# Patient Record
Sex: FEMALE | Race: WHITE | HISPANIC OR LATINO | Employment: UNEMPLOYED | URBAN - METROPOLITAN AREA
[De-identification: names, ages, dates, MRNs, and addresses within clinical notes are randomized per-mention and may not be internally consistent; named-entity substitution may affect disease eponyms.]

---

## 2024-05-15 ENCOUNTER — TELEPHONE (OUTPATIENT)
Dept: OBGYN | Facility: CLINIC | Age: 29
End: 2024-05-15

## 2024-05-15 DIAGNOSIS — Z33.2 TERMINATION OF PREGNANCY (FETUS): ICD-10-CM

## 2024-05-15 DIAGNOSIS — Z34.02 ENCOUNTER FOR SUPERVISION OF NORMAL FIRST PREGNANCY IN SECOND TRIMESTER: Primary | ICD-10-CM

## 2024-05-15 NOTE — TELEPHONE ENCOUNTER
Called patient with provided number below: [phone]     Per patient there was something wrong with her insurance so she resubmitted for it and she is waiting from approval from her employer.  States she should have are coverage.    Encouraged patient to call back ASAP when she has her insurance information.    Updated financial team with information above.    Annette Tomas RN

## 2024-05-15 NOTE — TELEPHONE ENCOUNTER
We can start looking at dates but she needs an ultrasound first with measurements first.   May be a good idea to ask [facility] about their availability. I am not available on my off days for the next 2 weeks.     1. Encounter for supervision of normal first pregnancy in second trimester    - US OB > 14 Weeks; Future    2. Termination of pregnancy (fetus)    - Case Request: DILATION AND EVACUATION, UTERUS UNDER ULTRASDOUND GUIDANCE FOR TERMINATION OF PREGNANCY    Wilma Turner MD

## 2024-05-15 NOTE — TELEPHONE ENCOUNTER
"Attempted to call patient to get insurance information to check on financial services.  Received automated message \"the person you have dialed is not accepting calls at this time\" then phone hung up.    Annette Tomas RN    "

## 2024-05-15 NOTE — TELEPHONE ENCOUNTER
LMP:  12/18/23, 21w2d.  Pt desires termination.  Pt contacted [facility] and they are booked up and told her to reach out to [facility].  Pt states she is on PP wait list but wants to see if [facility] can get her in.  I let her know that [facility] only has one provider that will do terminations up to 22w6d and couldn't guarantee that she would be able to get her in for a termination by then.    Routing encounter to the [facility] triage pool to see if they are able to get pt scheduled for a termination.    Please reach pt at [phone] to let her know if she can or can't be scheduled for a termination with [facility].    Kathryn Gabriel RN

## 2024-05-17 NOTE — TELEPHONE ENCOUNTER
Attempted to call patient to notify her that we do not have availability to accommodate her D&E termination.     Call went to voicemail. Mailbox is full unable to leave message.    Called other number provided ([employer] phone I believe) - [phone]     LVM for Deidre to call us back.    Annette Tomas RN

## 2024-05-20 NOTE — TELEPHONE ENCOUNTER
Second attempt at reaching patient on phone number provided:  ([facility] phone I believe) - [phone]     LVM for patient to call back.    Annette Tomas RN